# Patient Record
Sex: FEMALE | Race: BLACK OR AFRICAN AMERICAN | NOT HISPANIC OR LATINO | Employment: OTHER | URBAN - METROPOLITAN AREA
[De-identification: names, ages, dates, MRNs, and addresses within clinical notes are randomized per-mention and may not be internally consistent; named-entity substitution may affect disease eponyms.]

---

## 2017-05-24 ENCOUNTER — OFFICE VISIT (OUTPATIENT)
Dept: OBSTETRICS AND GYNECOLOGY | Facility: CLINIC | Age: 57
End: 2017-05-24

## 2017-05-24 VITALS
WEIGHT: 200.3 LBS | HEIGHT: 65 IN | DIASTOLIC BLOOD PRESSURE: 84 MMHG | BODY MASS INDEX: 33.37 KG/M2 | SYSTOLIC BLOOD PRESSURE: 142 MMHG

## 2017-05-24 DIAGNOSIS — N89.8 VAGINAL DISCHARGE: Primary | ICD-10-CM

## 2017-05-24 PROCEDURE — 99213 OFFICE O/P EST LOW 20 MIN: CPT | Performed by: NURSE PRACTITIONER

## 2017-05-26 LAB
A VAGINAE DNA VAG QL NAA+PROBE: NORMAL SCORE
BVAB2 DNA VAG QL NAA+PROBE: NORMAL SCORE
C ALBICANS DNA VAG QL NAA+PROBE: NEGATIVE
C GLABRATA DNA VAG QL NAA+PROBE: NEGATIVE
MEGA1 DNA VAG QL NAA+PROBE: NORMAL SCORE
T VAGINALIS RRNA SPEC QL NAA+PROBE: NEGATIVE

## 2017-05-30 ENCOUNTER — TELEPHONE (OUTPATIENT)
Dept: OBSTETRICS AND GYNECOLOGY | Facility: CLINIC | Age: 57
End: 2017-05-30

## 2017-05-31 RX ORDER — METRONIDAZOLE 500 MG/1
500 TABLET ORAL 2 TIMES DAILY
Qty: 14 TABLET | Refills: 0 | Status: SHIPPED | OUTPATIENT
Start: 2017-05-31 | End: 2020-08-26

## 2018-05-02 ENCOUNTER — OFFICE VISIT (OUTPATIENT)
Dept: OBSTETRICS AND GYNECOLOGY | Facility: CLINIC | Age: 58
End: 2018-05-02

## 2018-05-02 VITALS
BODY MASS INDEX: 34.16 KG/M2 | SYSTOLIC BLOOD PRESSURE: 122 MMHG | HEIGHT: 65 IN | DIASTOLIC BLOOD PRESSURE: 80 MMHG | WEIGHT: 205 LBS

## 2018-05-02 DIAGNOSIS — N88.8 NABOTHIAN CYST: ICD-10-CM

## 2018-05-02 DIAGNOSIS — Z78.0 MENOPAUSE: ICD-10-CM

## 2018-05-02 DIAGNOSIS — Z01.411 ENCOUNTER FOR GYNECOLOGICAL EXAMINATION WITH ABNORMAL FINDING: Primary | ICD-10-CM

## 2018-05-02 LAB
DEVELOPER EXPIRATION DATE: NORMAL
DEVELOPER LOT NUMBER: NORMAL
EXPIRATION DATE: NORMAL
FECAL OCCULT BLOOD SCREEN, POC: NEGATIVE
Lab: NORMAL
NEGATIVE CONTROL: NEGATIVE
POSITIVE CONTROL: POSITIVE

## 2018-05-02 PROCEDURE — G0328 FECAL BLOOD SCRN IMMUNOASSAY: HCPCS | Performed by: OBSTETRICS & GYNECOLOGY

## 2018-05-02 PROCEDURE — 99396 PREV VISIT EST AGE 40-64: CPT | Performed by: OBSTETRICS & GYNECOLOGY

## 2018-05-02 NOTE — PROGRESS NOTES
Subjective    Chief Complaint   Patient presents with   • Gynecologic Exam     MX TODAY. DX NOT DONE. C-SCOPY 2 YRS. AGO       History of Present Illness    Zack Coppola is a 57 y.o. female who presents for annual exam. Patient had a mammogram today and is going to check her insurance concerning DEXA scan's.  Colonoscopy was 2 years ago and due every 5 years due to a family history.  She is a nonsmoker.  She is having no bleeding or issues.    Obstetric History:  OB History     No data available         Menstrual History:     No LMP recorded.       Past Medical History:   Diagnosis Date   • Diabetes    • Hypertension      Family History   Problem Relation Age of Onset   • Hypertension Mother    • Hypertension Brother    • Hypertension Sister    • Hypertension Brother        The following portions of the patient's history were reviewed and updated as appropriate: allergies, current medications, past family history, past medical history, past social history, past surgical history and problem list.    Review of Systems   Constitutional: Negative.  Negative for fever and unexpected weight change.   HENT: Negative.    Respiratory: Negative for shortness of breath and wheezing.    Cardiovascular: Negative for chest pain, palpitations and leg swelling.   Gastrointestinal: Negative for abdominal pain, anal bleeding and blood in stool.   Genitourinary: Negative for dysuria, pelvic pain, urgency, vaginal bleeding, vaginal discharge and vaginal pain.   Skin: Negative.    Neurological: Negative.    Hematological: Negative.  Negative for adenopathy.   Psychiatric/Behavioral: Negative.  Negative for dysphoric mood. The patient is not nervous/anxious.             Objective   Physical Exam   Constitutional: She is oriented to person, place, and time. Vital signs are normal. She appears well-developed and well-nourished.   HENT:   Head: Normocephalic.   Neck: Trachea normal. No tracheal deviation present. No thyromegaly present.  "  Cardiovascular: Normal rate, regular rhythm and normal heart sounds.    No murmur heard.  Pulmonary/Chest: Effort normal and breath sounds normal.   Breasts without masses, tenderness or nipple discharge   Abdominal: Soft. Normal appearance. She exhibits no mass. There is no hepatosplenomegaly. There is no tenderness. No hernia.   Genitourinary: Rectum normal, vagina normal and uterus normal. Rectal exam shows guaiac negative stool. Uterus is not enlarged and not tender. Cervix exhibits no motion tenderness. Right adnexum displays no mass and no tenderness. Left adnexum displays no mass and no tenderness. No vaginal discharge found.   Genitourinary Comments: External genitalia normal .  Tiny probable nabothian cyst drained with a scalpel and silver nitrate applied. Pt will rto 3 weeks to recheck it.    Lymphadenopathy:     She has no cervical adenopathy.     She has no axillary adenopathy.   Neurological: She is alert and oriented to person, place, and time.   Skin: Skin is warm and dry. No rash noted.   Psychiatric: She has a normal mood and affect. Her behavior is normal. Cognition and memory are normal.       /80   Ht 165.1 cm (65\")   Wt 93 kg (205 lb)   BMI 34.11 kg/m²     Assessment/Plan   Zack was seen today for gynecologic exam.    Diagnoses and all orders for this visit:    Encounter for gynecological examination with abnormal finding  -     IGP,rfx Aptima HPV All Pth  -     POC Occult Blood Stool    Nabothian cyst    Menopause        Getting copy of mammogram.  Patient to return to office in 3 weeks to recheck her nabothian cyst.  If still present will need a biopsy.    Counseled about the importance of beginning calcium with vitamin D twice daily.  Also discussed DEXA scan's.         "

## 2018-05-04 LAB
CONV .: NORMAL
CYTOLOGIST CVX/VAG CYTO: NORMAL
CYTOLOGY CVX/VAG DOC THIN PREP: NORMAL
DX ICD CODE: NORMAL
HIV 1 & 2 AB SER-IMP: NORMAL
OTHER STN SPEC: NORMAL
PATH REPORT.FINAL DX SPEC: NORMAL
STAT OF ADQ CVX/VAG CYTO-IMP: NORMAL

## 2018-07-11 ENCOUNTER — OFFICE VISIT (OUTPATIENT)
Dept: OBSTETRICS AND GYNECOLOGY | Facility: CLINIC | Age: 58
End: 2018-07-11

## 2018-07-11 ENCOUNTER — OFFICE (OUTPATIENT)
Dept: URBAN - METROPOLITAN AREA CLINIC 42 | Facility: CLINIC | Age: 58
End: 2018-07-11

## 2018-07-11 VITALS
BODY MASS INDEX: 34.49 KG/M2 | HEIGHT: 65 IN | SYSTOLIC BLOOD PRESSURE: 148 MMHG | WEIGHT: 207 LBS | DIASTOLIC BLOOD PRESSURE: 82 MMHG

## 2018-07-11 VITALS — WEIGHT: 208 LBS | HEIGHT: 67 IN

## 2018-07-11 DIAGNOSIS — R10.13 EPIGASTRIC PAIN: ICD-10-CM

## 2018-07-11 DIAGNOSIS — N95.0 POSTMENOPAUSAL BLEEDING: Primary | ICD-10-CM

## 2018-07-11 DIAGNOSIS — R11.0 NAUSEA: ICD-10-CM

## 2018-07-11 DIAGNOSIS — K76.89 OTHER SPECIFIED DISEASES OF LIVER: ICD-10-CM

## 2018-07-11 PROCEDURE — 58100 BIOPSY OF UTERUS LINING: CPT | Performed by: OBSTETRICS & GYNECOLOGY

## 2018-07-11 PROCEDURE — 99214 OFFICE O/P EST MOD 30 MIN: CPT

## 2018-07-11 PROCEDURE — 99213 OFFICE O/P EST LOW 20 MIN: CPT | Performed by: OBSTETRICS & GYNECOLOGY

## 2018-07-11 NOTE — PROGRESS NOTES
"Subjective    Chief Complaint   Patient presents with   • Follow-up     fu, re check vag cyst,       History of Present Illness    Zack Coppola is a 57 y.o. female who presents for Repeat check nabothian cyst which was drained.  Pap smear was negative.  Patient having no other problems in no vaginal bleeding for years.    Obstetric History:  OB History     No data available         Menstrual History:     No LMP recorded.       Past Medical History:   Diagnosis Date   • Diabetes (CMS/HCC)    • Hypertension      Family History   Problem Relation Age of Onset   • Hypertension Mother    • Hypertension Brother    • Hypertension Sister    • Hypertension Brother            Review of Systems  Negative        Objective   Physical Exam  Nabothian cyst totally resolved today.  Patient had a little brown discharge in the vagina it as a precaution and endometrial biopsy was advised and the patient agreed to it.  Endometrial biopsy performed with the anterior lip of the cervix grasped with a tenaculum and the uterus sounded to 8 cm.  An endometrial biopsy was performed 3 times without problem and without dilatation of the cervix.  The patient tolerated the procedure well and will call 1 week for results.  /82   Ht 165.1 cm (65\")   Wt 93.9 kg (207 lb)   BMI 34.45 kg/m²     Assessment/Plan   Zack was seen today for follow-up.    Diagnoses and all orders for this visit:    Postmenopausal bleeding  -     Reference Histopathology  -     Endometrial Biopsy        Call 1 week for results of endometrial biopsy.               "

## 2018-07-13 LAB
DX ICD CODE: NORMAL
DX ICD CODE: NORMAL
PATH REPORT.FINAL DX SPEC: NORMAL
PATH REPORT.GROSS SPEC: NORMAL
PATH REPORT.SITE OF ORIGIN SPEC: NORMAL
PATHOLOGIST NAME: NORMAL
PAYMENT PROCEDURE: NORMAL

## 2018-07-18 ENCOUNTER — TELEPHONE (OUTPATIENT)
Dept: OBSTETRICS AND GYNECOLOGY | Facility: CLINIC | Age: 58
End: 2018-07-18

## 2018-07-18 ENCOUNTER — ON CAMPUS - OUTPATIENT (OUTPATIENT)
Dept: URBAN - METROPOLITAN AREA HOSPITAL 91 | Facility: HOSPITAL | Age: 58
End: 2018-07-18
Payer: COMMERCIAL

## 2018-07-18 DIAGNOSIS — K30 FUNCTIONAL DYSPEPSIA: ICD-10-CM

## 2018-07-18 DIAGNOSIS — K44.9 DIAPHRAGMATIC HERNIA WITHOUT OBSTRUCTION OR GANGRENE: ICD-10-CM

## 2018-07-18 DIAGNOSIS — K20.8 OTHER ESOPHAGITIS: ICD-10-CM

## 2018-07-18 DIAGNOSIS — R11.0 NAUSEA: ICD-10-CM

## 2018-07-18 DIAGNOSIS — K31.7 POLYP OF STOMACH AND DUODENUM: ICD-10-CM

## 2018-07-18 DIAGNOSIS — R10.13 EPIGASTRIC PAIN: ICD-10-CM

## 2018-07-18 PROCEDURE — 43235 EGD DIAGNOSTIC BRUSH WASH: CPT

## 2018-07-18 NOTE — TELEPHONE ENCOUNTER
----- Message from Mitchel Aquino MD sent at 7/13/2018  5:57 PM EDT -----  Moncho please tell pt EMB was totally benign. Thanks KINGSLEY

## 2018-09-24 ENCOUNTER — TELEPHONE (OUTPATIENT)
Dept: OBSTETRICS AND GYNECOLOGY | Facility: CLINIC | Age: 58
End: 2018-09-24

## 2019-04-16 DIAGNOSIS — Z12.31 VISIT FOR SCREENING MAMMOGRAM: Primary | ICD-10-CM

## 2019-05-06 DIAGNOSIS — Z12.31 VISIT FOR SCREENING MAMMOGRAM: ICD-10-CM

## 2019-06-12 ENCOUNTER — OFFICE VISIT (OUTPATIENT)
Dept: OBSTETRICS AND GYNECOLOGY | Facility: CLINIC | Age: 59
End: 2019-06-12

## 2019-06-12 VITALS
WEIGHT: 206.2 LBS | BODY MASS INDEX: 34.35 KG/M2 | DIASTOLIC BLOOD PRESSURE: 74 MMHG | SYSTOLIC BLOOD PRESSURE: 122 MMHG | HEIGHT: 65 IN

## 2019-06-12 DIAGNOSIS — Z01.419 ENCOUNTER FOR GYNECOLOGICAL EXAMINATION WITHOUT ABNORMAL FINDING: Primary | ICD-10-CM

## 2019-06-12 DIAGNOSIS — Z12.39 SCREENING FOR BREAST CANCER: ICD-10-CM

## 2019-06-12 PROCEDURE — G0328 FECAL BLOOD SCRN IMMUNOASSAY: HCPCS | Performed by: OBSTETRICS & GYNECOLOGY

## 2019-06-12 PROCEDURE — 99396 PREV VISIT EST AGE 40-64: CPT | Performed by: OBSTETRICS & GYNECOLOGY

## 2019-06-12 NOTE — PROGRESS NOTES
Subjective    Chief Complaint   Patient presents with   • Gynecologic Exam     ae, no problems, pm no bleeding, no hrt's      History of Present Illness    Zack Coppola is a 58 y.o. female who presents for annual exam.  Non-smoker.  Mammogram May 2019 normal.  DEXA scan's not done due to cost.  Colonoscopy was in 2016 and due every 5 due to a history of polyps.  No current bleeding or problems.    Obstetric History:  OB History     No data available         Menstrual History:     No LMP recorded.       Past Medical History:   Diagnosis Date   • Diabetes (CMS/HCC)    • Hypertension      Family History   Problem Relation Age of Onset   • Hypertension Mother    • Hypertension Brother    • Hypertension Sister    • Hypertension Brother      Social History     Tobacco Use   Smoking Status Former Smoker   Smokeless Tobacco Never Used     Counseling given: Not Answered      The following portions of the patient's history were reviewed and updated as appropriate: allergies, current medications, past family history, past medical history, past social history, past surgical history and problem list.    Review of Systems   Constitutional: Negative.  Negative for fever and unexpected weight change.   HENT: Negative.    Respiratory: Negative for shortness of breath and wheezing.    Cardiovascular: Negative for chest pain, palpitations and leg swelling.   Gastrointestinal: Negative for abdominal pain, anal bleeding and blood in stool.   Genitourinary: Negative for dysuria, pelvic pain, urgency, vaginal bleeding, vaginal discharge and vaginal pain.   Skin: Negative.    Neurological: Negative.    Hematological: Negative.  Negative for adenopathy.   Psychiatric/Behavioral: Negative.  Negative for dysphoric mood. The patient is not nervous/anxious.             Objective   Physical Exam   Constitutional: She is oriented to person, place, and time. Vital signs are normal. She appears well-developed and well-nourished.   HENT:   Head:  "Normocephalic.   Neck: Trachea normal. No tracheal deviation present. No thyromegaly present.   Cardiovascular: Normal rate, regular rhythm and normal heart sounds.   No murmur heard.  Pulmonary/Chest: Effort normal and breath sounds normal.   Breasts without masses, tenderness or nipple discharge   Abdominal: Soft. Normal appearance. She exhibits no mass. There is no hepatosplenomegaly. There is no tenderness. No hernia.   Genitourinary: Rectum normal, vagina normal and uterus normal. Rectal exam shows guaiac negative stool. Uterus is not enlarged and not tender. Cervix exhibits no motion tenderness. Right adnexum displays no mass and no tenderness. Left adnexum displays no mass and no tenderness. No vaginal discharge found.   Genitourinary Comments: External genitalia normal    Lymphadenopathy:     She has no cervical adenopathy.     She has no axillary adenopathy.   Neurological: She is alert and oriented to person, place, and time.   Skin: Skin is warm and dry. No rash noted.   Psychiatric: She has a normal mood and affect. Her behavior is normal. Cognition and memory are normal.       /74   Ht 165.1 cm (65\")   Wt 93.5 kg (206 lb 3.2 oz)   BMI 34.31 kg/m²     Assessment/Plan   Zack was seen today for gynecologic exam.    Diagnoses and all orders for this visit:    Encounter for gynecological examination without abnormal finding  -     IGP,rfx Aptima HPV All Pth  -     POC Occult Blood Stool    Screening for breast cancer        Return to office 1 year.    Counseled about taking calcium with vitamin D.  Patient takes vitamin D currently.         "

## 2019-06-14 LAB
CONV .: NORMAL
CYTOLOGIST CVX/VAG CYTO: NORMAL
CYTOLOGY CVX/VAG DOC CYTO: NORMAL
CYTOLOGY CVX/VAG DOC THIN PREP: NORMAL
DX ICD CODE: NORMAL
HIV 1 & 2 AB SER-IMP: NORMAL
OTHER STN SPEC: NORMAL
STAT OF ADQ CVX/VAG CYTO-IMP: NORMAL

## 2019-10-09 ENCOUNTER — OFFICE VISIT (OUTPATIENT)
Dept: OBSTETRICS AND GYNECOLOGY | Facility: CLINIC | Age: 59
End: 2019-10-09

## 2019-10-09 VITALS
BODY MASS INDEX: 34.66 KG/M2 | WEIGHT: 208 LBS | HEIGHT: 65 IN | DIASTOLIC BLOOD PRESSURE: 90 MMHG | SYSTOLIC BLOOD PRESSURE: 148 MMHG

## 2019-10-09 DIAGNOSIS — N89.8 VAGINAL DISCHARGE: Primary | ICD-10-CM

## 2019-10-09 DIAGNOSIS — R10.2 PELVIC PAIN: ICD-10-CM

## 2019-10-09 PROCEDURE — 99213 OFFICE O/P EST LOW 20 MIN: CPT | Performed by: OBSTETRICS & GYNECOLOGY

## 2019-10-09 NOTE — PROGRESS NOTES
"Subjective    Chief Complaint   Patient presents with   • Follow-up     PT STATES ITCHING, ODOR, DISCHARGE      History of Present Illness    Zack Coppola is a 58 y.o. female who presents for evaluation of recent minor vaginal discharge and odor without itching.  She also has 2-week history of some mild left lower quadrant pain.  Her discharge is also been for about 2 weeks and she has used no medication for it.  She had a Pap smear this summer which was negative.    Obstetric History:  OB History     No data available         Menstrual History:     No LMP recorded.       Past Medical History:   Diagnosis Date   • Diabetes (CMS/[facility])    • Hypertension      Family History   Problem Relation Age of Onset   • Hypertension Mother    • Hypertension Brother    • Hypertension Sister    • Hypertension Brother        The following portions of the patient's history were reviewed and updated as appropriate: allergies, current medications, past family history, past medical history, past social history and problem list.    Review of Systems  As per HPI       Objective   Physical Exam  Examination of the vagina demonstrate what appears to be normal physiologic discharge but a vaginal culture for vaginitis was performed anyway.  Cervix is without lesion.  Uterus nontender and normal size.  Very minimal left adnexal tenderness which does not appear to be very significant today.  /90   Ht 165.1 cm (65\")   Wt 94.3 kg (208 lb)   BMI 34.61 kg/m²     Assessment/Plan   Zack was seen today for follow-up.    Diagnoses and all orders for this visit:    Vaginal discharge  -     NuSwab Vaginitis (VG) - Swab, Vagina    Pelvic pain        Impression and plan.  15 minute visit today of which 10 minutes was face-to-face counseling concerning the reasons for vaginal discharge and pain.  I have obtained a culture for vaginitis although patient not needing testing for STDs she states that she is not sexually active.  We also discussed " getting an ultrasound of her pain persists over the next week while her cultures returning and she will let me know if she would like that ordered when we speak next week about her results.  I explained to her that her vaginal discharge may in fact be normal physiologic discharge.

## 2019-10-12 LAB
A VAGINAE DNA VAG QL NAA+PROBE: ABNORMAL SCORE
BVAB2 DNA VAG QL NAA+PROBE: ABNORMAL SCORE
C ALBICANS DNA VAG QL NAA+PROBE: POSITIVE
C GLABRATA DNA VAG QL NAA+PROBE: NEGATIVE
MEGA1 DNA VAG QL NAA+PROBE: ABNORMAL SCORE
T VAGINALIS RRNA SPEC QL NAA+PROBE: NEGATIVE

## 2019-10-14 ENCOUNTER — TELEPHONE (OUTPATIENT)
Dept: OBSTETRICS AND GYNECOLOGY | Facility: CLINIC | Age: 59
End: 2019-10-14

## 2019-10-14 NOTE — TELEPHONE ENCOUNTER
----- Message from Mitchel Aquino MD sent at 10/13/2019 10:06 AM EDT -----  Please tell pt she has yeast and I sent Terazol-7 to her pharmacy. KINGSLEY

## 2019-10-30 ENCOUNTER — OFFICE (OUTPATIENT)
Dept: URBAN - METROPOLITAN AREA CLINIC 42 | Facility: CLINIC | Age: 59
End: 2019-10-30

## 2019-10-30 VITALS
WEIGHT: 213 LBS | SYSTOLIC BLOOD PRESSURE: 164 MMHG | TEMPERATURE: 97 F | DIASTOLIC BLOOD PRESSURE: 104 MMHG | HEIGHT: 67 IN | HEART RATE: 77 BPM

## 2019-10-30 DIAGNOSIS — R14.0 ABDOMINAL DISTENSION (GASEOUS): ICD-10-CM

## 2019-10-30 DIAGNOSIS — K76.89 OTHER SPECIFIED DISEASES OF LIVER: ICD-10-CM

## 2019-10-30 DIAGNOSIS — K21.9 GASTRO-ESOPHAGEAL REFLUX DISEASE WITHOUT ESOPHAGITIS: ICD-10-CM

## 2019-10-30 PROCEDURE — 99214 OFFICE O/P EST MOD 30 MIN: CPT

## 2019-10-30 RX ORDER — PANTOPRAZOLE SODIUM 40 MG/1
40 TABLET, DELAYED RELEASE ORAL
Qty: 90 | Refills: 3 | Status: ACTIVE
Start: 2019-10-30

## 2019-12-05 ENCOUNTER — TELEPHONE (OUTPATIENT)
Dept: OBSTETRICS AND GYNECOLOGY | Facility: CLINIC | Age: 59
End: 2019-12-05

## 2019-12-05 NOTE — TELEPHONE ENCOUNTER
Patient says that she can not find her prescription for terconazole (TERAZOL 7) 0.4 % vaginal cream    She wants to know if more can be called in to her pharmacy.

## 2020-04-08 ENCOUNTER — TELEPHONE (OUTPATIENT)
Dept: OBSTETRICS AND GYNECOLOGY | Facility: CLINIC | Age: 60
End: 2020-04-08

## 2020-04-08 DIAGNOSIS — Z12.31 VISIT FOR SCREENING MAMMOGRAM: Primary | ICD-10-CM

## 2020-04-08 NOTE — TELEPHONE ENCOUNTER
Patient states that she needs her referral for her mammogram dated for sometime after May the 3rd  [facility].

## 2020-08-26 ENCOUNTER — OFFICE VISIT (OUTPATIENT)
Dept: OBSTETRICS AND GYNECOLOGY | Facility: CLINIC | Age: 60
End: 2020-08-26

## 2020-08-26 VITALS
HEIGHT: 65 IN | WEIGHT: 209 LBS | BODY MASS INDEX: 34.82 KG/M2 | SYSTOLIC BLOOD PRESSURE: 142 MMHG | DIASTOLIC BLOOD PRESSURE: 70 MMHG

## 2020-08-26 DIAGNOSIS — Z12.39 SCREENING FOR BREAST CANCER: ICD-10-CM

## 2020-08-26 DIAGNOSIS — Z01.419 ENCOUNTER FOR GYNECOLOGICAL EXAMINATION WITHOUT ABNORMAL FINDING: Primary | ICD-10-CM

## 2020-08-26 PROCEDURE — 99396 PREV VISIT EST AGE 40-64: CPT | Performed by: OBSTETRICS & GYNECOLOGY

## 2020-08-26 RX ORDER — SODIUM, POTASSIUM,MAG SULFATES 17.5-3.13G
SOLUTION, RECONSTITUTED, ORAL ORAL
COMMUNITY
Start: 2020-06-23 | End: 2021-11-01

## 2020-08-26 RX ORDER — AMLODIPINE BESYLATE 5 MG/1
TABLET ORAL
COMMUNITY
Start: 2020-07-29 | End: 2021-11-01

## 2020-08-26 RX ORDER — ERGOCALCIFEROL 1.25 MG/1
CAPSULE ORAL
COMMUNITY
Start: 2020-07-29

## 2020-08-26 NOTE — PROGRESS NOTES
Subjective    Chief Complaint   Patient presents with   • Gynecologic Exam     AE      History of Present Illness    Zack Coppola is a 59 y.o. female who presents for annual exam.  Non-smoker.  Colonoscopy scheduled August 31..  Refused DEXA scan due to cost.  Mammogram done in July and negative.  No bleeding no problems.    Obstetric History:  OB History    None        Menstrual History:     No LMP recorded.       Past Medical History:   Diagnosis Date   • Diabetes (CMS/HCC)    • Hypertension      Family History   Problem Relation Age of Onset   • Hypertension Mother    • Hypertension Brother    • Hypertension Sister    • Hypertension Brother      Social History     Tobacco Use   Smoking Status Former Smoker   Smokeless Tobacco Never Used         The following portions of the patient's history were reviewed and updated as appropriate: allergies, current medications, past family history, past medical history, past social history, past surgical history and problem list.    Review of Systems   Constitutional: Negative.  Negative for fever and unexpected weight change.   HENT: Negative.    Respiratory: Negative for shortness of breath and wheezing.    Cardiovascular: Negative for chest pain, palpitations and leg swelling.   Gastrointestinal: Negative for abdominal pain, anal bleeding and blood in stool.   Genitourinary: Negative for dysuria, pelvic pain, urgency, vaginal bleeding, vaginal discharge and vaginal pain.   Skin: Negative.    Neurological: Negative.    Hematological: Negative.  Negative for adenopathy.   Psychiatric/Behavioral: Negative.  Negative for dysphoric mood. The patient is not nervous/anxious.             Objective   Physical Exam   Constitutional: She is oriented to person, place, and time. Vital signs are normal. She appears well-developed and well-nourished.   HENT:   Head: Normocephalic.   Neck: Trachea normal. No tracheal deviation present. No thyromegaly present.   Cardiovascular: Normal  "rate, regular rhythm and normal heart sounds.   No murmur heard.  Pulmonary/Chest: Effort normal and breath sounds normal.   Breasts without masses, tenderness or nipple discharge   Abdominal: Soft. Normal appearance. She exhibits no mass. There is no hepatosplenomegaly. There is no tenderness. No hernia.   Genitourinary: Rectum normal, vagina normal and uterus normal. Uterus is not enlarged and not tender. Cervix exhibits no motion tenderness. Right adnexum displays no mass and no tenderness. Left adnexum displays no mass and no tenderness. No vaginal discharge found.   Genitourinary Comments: External genitalia normal    Lymphadenopathy:     She has no cervical adenopathy.     She has no axillary adenopathy.   Neurological: She is alert and oriented to person, place, and time.   Skin: Skin is warm and dry. No rash noted.   Psychiatric: She has a normal mood and affect. Her behavior is normal. Cognition and memory are normal.       /70   Ht 165.1 cm (65\")   Wt 94.8 kg (209 lb)   BMI 34.78 kg/m²     Assessment/Plan   Zack was seen today for gynecologic exam.    Diagnoses and all orders for this visit:    Encounter for gynecological examination without abnormal finding  -     IGP,rfx Aptima HPV All Pth    Screening for breast cancer        Return to office 1 year or as needed.  Counseled about continuing calcium and vitamin D.  Counseled about colorectal screening.             "

## 2021-11-01 ENCOUNTER — OFFICE VISIT (OUTPATIENT)
Dept: OBSTETRICS AND GYNECOLOGY | Facility: CLINIC | Age: 61
End: 2021-11-01

## 2021-11-01 VITALS
SYSTOLIC BLOOD PRESSURE: 129 MMHG | BODY MASS INDEX: 34.49 KG/M2 | WEIGHT: 207 LBS | HEIGHT: 65 IN | DIASTOLIC BLOOD PRESSURE: 93 MMHG

## 2021-11-01 DIAGNOSIS — Z01.419 ENCOUNTER FOR GYNECOLOGICAL EXAMINATION WITHOUT ABNORMAL FINDING: Primary | ICD-10-CM

## 2021-11-01 DIAGNOSIS — Z12.39 ENCOUNTER FOR BREAST CANCER SCREENING USING NON-MAMMOGRAM MODALITY: ICD-10-CM

## 2021-11-01 DIAGNOSIS — N89.8 VAGINAL DISCHARGE: ICD-10-CM

## 2021-11-01 PROCEDURE — 99396 PREV VISIT EST AGE 40-64: CPT | Performed by: OBSTETRICS & GYNECOLOGY

## 2021-11-01 NOTE — PROGRESS NOTES
Subjective    Chief Complaint   Patient presents with   • Gynecologic Exam     AE      History of Present Illness    Zack Coppola is a 60 y.o. female who presents for annual exam.  Non-smoker.  Mammogram July 2021-negative.  Colonoscopy 2020 had a polyp so due again after 5 years.  Refused DEXA scan.  Is taking vitamin D3.  No current problems and no bleeding.    Obstetric History:  OB History    No obstetric history on file.        Menstrual History:     No LMP recorded. Patient is postmenopausal.       Past Medical History:   Diagnosis Date   • Diabetes (HCC)    • Hypertension      Family History   Problem Relation Age of Onset   • Hypertension Mother    • Hypertension Brother    • Hypertension Sister    • Hypertension Brother      Social History     Tobacco Use   Smoking Status Former Smoker   Smokeless Tobacco Never Used         The following portions of the patient's history were reviewed and updated as appropriate: allergies, current medications, past family history, past medical history, past social history, past surgical history and problem list.    Review of Systems   Constitutional: Negative.  Negative for fever and unexpected weight change.   HENT: Negative.    Respiratory: Negative for shortness of breath and wheezing.    Cardiovascular: Negative for chest pain, palpitations and leg swelling.   Gastrointestinal: Negative for abdominal pain, anal bleeding and blood in stool.   Genitourinary: Negative for dysuria, pelvic pain, urgency, vaginal bleeding, vaginal discharge and vaginal pain.   Skin: Negative.    Neurological: Negative.    Hematological: Negative.  Negative for adenopathy.   Psychiatric/Behavioral: Negative.  Negative for dysphoric mood. The patient is not nervous/anxious.             Objective   Physical Exam  Exam conducted with a chaperone present.   Constitutional:       Appearance: Normal appearance. She is well-developed.   HENT:      Head: Normocephalic.   Neck:      Thyroid: No  "thyromegaly.      Trachea: Trachea normal. No tracheal deviation.   Cardiovascular:      Rate and Rhythm: Normal rate and regular rhythm.      Heart sounds: Normal heart sounds. No murmur heard.      Pulmonary:      Effort: Pulmonary effort is normal.      Breath sounds: Normal breath sounds.   Chest:   Breasts:      Right: Normal. No mass, nipple discharge, tenderness or axillary adenopathy.      Left: Normal. No mass, nipple discharge, tenderness or axillary adenopathy.       Abdominal:      Palpations: Abdomen is soft. There is no mass.      Tenderness: There is no abdominal tenderness.      Hernia: No hernia is present.   Genitourinary:     General: Normal vulva.      Labia:         Right: No tenderness or lesion.         Left: No tenderness or lesion.       Urethra: No prolapse or urethral lesion.      Vagina: Normal. No vaginal discharge or lesions.      Cervix: No cervical motion tenderness.      Uterus: Not enlarged and not tender.       Adnexa:         Right: No mass or tenderness.          Left: No mass or tenderness.        Rectum: Normal. No external hemorrhoid or internal hemorrhoid. Normal anal tone.      Comments: External genitalia normal   Lymphadenopathy:      Cervical: No cervical adenopathy.      Upper Body:      Right upper body: No axillary adenopathy.      Left upper body: No axillary adenopathy.   Skin:     General: Skin is warm and dry.      Findings: No rash.   Neurological:      Mental Status: She is alert and oriented to person, place, and time.   Psychiatric:         Behavior: Behavior normal.         /93   Ht 165.1 cm (65\")   Wt 93.9 kg (207 lb)   BMI 34.45 kg/m²     Assessment/Plan   Diagnoses and all orders for this visit:    1. Encounter for gynecological examination without abnormal finding (Primary)  -     IGP,rfx Aptima HPV All Pth    2. Encounter for breast cancer screening using non-mammogram modality    3. Vaginal discharge  -     NuSwab VG+ - Swab, " Vagina        Return to office 1 year.  Counseled about continuing vitamin D3 for osteoporosis prevention.

## 2021-11-03 LAB
A VAGINAE DNA VAG QL NAA+PROBE: ABNORMAL SCORE
BVAB2 DNA VAG QL NAA+PROBE: ABNORMAL SCORE
C ALBICANS DNA VAG QL NAA+PROBE: POSITIVE
C GLABRATA DNA VAG QL NAA+PROBE: NEGATIVE
C TRACH DNA VAG QL NAA+PROBE: NEGATIVE
MEGA1 DNA VAG QL NAA+PROBE: ABNORMAL SCORE
N GONORRHOEA DNA VAG QL NAA+PROBE: NEGATIVE
T VAGINALIS DNA VAG QL NAA+PROBE: NEGATIVE

## 2021-11-05 ENCOUNTER — TELEPHONE (OUTPATIENT)
Dept: OBSTETRICS AND GYNECOLOGY | Facility: CLINIC | Age: 61
End: 2021-11-05

## 2021-11-05 RX ORDER — FLUCONAZOLE 150 MG/1
150 TABLET ORAL ONCE
Qty: 1 TABLET | Refills: 0 | Status: SHIPPED | OUTPATIENT
Start: 2021-11-05 | End: 2021-11-05

## 2021-11-05 NOTE — TELEPHONE ENCOUNTER
----- Message from Mitchel Aquino MD sent at 11/5/2021  1:40 PM EDT -----  Please tell patient that she only had yeast on her vaginal culture so I did send a Diflucan tablet to her pharmacy.

## 2021-11-05 NOTE — PROGRESS NOTES
Please tell patient that she only had yeast on her vaginal culture so I did send a Diflucan tablet to her pharmacy.

## 2022-02-21 ENCOUNTER — OFFICE VISIT (OUTPATIENT)
Dept: OBSTETRICS AND GYNECOLOGY | Facility: CLINIC | Age: 62
End: 2022-02-21

## 2022-02-21 VITALS
HEIGHT: 65 IN | DIASTOLIC BLOOD PRESSURE: 88 MMHG | SYSTOLIC BLOOD PRESSURE: 130 MMHG | WEIGHT: 207 LBS | BODY MASS INDEX: 34.49 KG/M2

## 2022-02-21 DIAGNOSIS — N89.8 VAGINAL DISCHARGE: Primary | ICD-10-CM

## 2022-02-21 PROCEDURE — 99213 OFFICE O/P EST LOW 20 MIN: CPT | Performed by: OBSTETRICS & GYNECOLOGY

## 2022-02-21 RX ORDER — POTASSIUM CHLORIDE 750 MG/1
CAPSULE, EXTENDED RELEASE ORAL
COMMUNITY
Start: 2022-01-20

## 2022-02-21 NOTE — PROGRESS NOTES
"Subjective    Chief Complaint   Patient presents with   • Follow-up     Pt states itching, discharge, odor       History of Present Illness    Zack Coppola is a 61 y.o. female who presents for vaginal discharge with odor and itching.  Patient did have yeast demonstrated on vaginal culture last year.  Patient has not taken anything for this current infection.  She is not currently sexually active.    Obstetric History:  OB History    No obstetric history on file.        Menstrual History:     No LMP recorded. Patient is postmenopausal.       Past Medical History:   Diagnosis Date   • Diabetes (HCC)    • Hypertension      Family History   Problem Relation Age of Onset   • Hypertension Mother    • Hypertension Brother    • Hypertension Sister    • Hypertension Brother        The following portions of the patient's history were reviewed and updated as appropriate: allergies, current medications, past medical history, past social history, past surgical history and problem list.    Review of Systems  As per HPI       Objective   Physical Exam  Vagina demonstrates a milky white discharge not very consistent with yeast.  Vaginal culture performed.  Bimanual exam totally negative.  /88   Ht 165.1 cm (65\")   Wt 93.9 kg (207 lb)   BMI 34.45 kg/m²     Assessment/Plan   Diagnoses and all orders for this visit:    1. Vaginal discharge (Primary)  -     NuSwab Vaginitis (VG) - Swab, Vagina        Impression and plan.  Patient will call next week to check on vaginal culture.  Discussed the possibility this is bacterial vaginosis and treatment would probably be Flagyl pills.  Will await culture results.               "

## 2022-02-23 ENCOUNTER — TELEPHONE (OUTPATIENT)
Dept: OBSTETRICS AND GYNECOLOGY | Facility: CLINIC | Age: 62
End: 2022-02-23

## 2022-02-23 LAB
A VAGINAE DNA VAG QL NAA+PROBE: NORMAL SCORE
BVAB2 DNA VAG QL NAA+PROBE: NORMAL SCORE
C ALBICANS DNA VAG QL NAA+PROBE: NEGATIVE
C GLABRATA DNA VAG QL NAA+PROBE: NEGATIVE
MEGA1 DNA VAG QL NAA+PROBE: NORMAL SCORE
T VAGINALIS DNA VAG QL NAA+PROBE: NEGATIVE

## 2022-02-23 RX ORDER — FLUCONAZOLE 150 MG/1
150 TABLET ORAL ONCE
Qty: 1 TABLET | Refills: 0 | Status: SHIPPED | OUTPATIENT
Start: 2022-02-23 | End: 2022-02-23

## 2022-02-23 NOTE — TELEPHONE ENCOUNTER
Pt informed of results and stated understanding. Did ask if a refill of Diflucan could be sent in as she still is having the itching. Pharm is [facility] [place] told her I would ask if that could be sent. SM

## 2022-02-23 NOTE — TELEPHONE ENCOUNTER
----- Message from Rosaura Redman MD sent at 2/23/2022  9:57 AM EST -----  Penta patient.  Please let her know that her vaginal culture was negative for infection.

## 2023-03-06 ENCOUNTER — OFFICE VISIT (OUTPATIENT)
Dept: OBSTETRICS AND GYNECOLOGY | Facility: CLINIC | Age: 63
End: 2023-03-06
Payer: MEDICAID

## 2023-03-06 VITALS
SYSTOLIC BLOOD PRESSURE: 140 MMHG | BODY MASS INDEX: 31.82 KG/M2 | WEIGHT: 198 LBS | HEIGHT: 66 IN | DIASTOLIC BLOOD PRESSURE: 99 MMHG

## 2023-03-06 DIAGNOSIS — N89.8 VAGINAL DISCHARGE: ICD-10-CM

## 2023-03-06 DIAGNOSIS — N95.1 MENOPAUSAL STATE: ICD-10-CM

## 2023-03-06 DIAGNOSIS — Z01.411 ENCOUNTER FOR GYNECOLOGICAL EXAMINATION WITH ABNORMAL FINDING: Primary | ICD-10-CM

## 2023-03-06 PROCEDURE — 99396 PREV VISIT EST AGE 40-64: CPT | Performed by: OBSTETRICS & GYNECOLOGY

## 2023-03-06 NOTE — PROGRESS NOTES
Subjective    Chief Complaint   Patient presents with   • Gynecologic Exam     AE      History of Present Illness    Zack Coppola is a 62 y.o. female who presents for annual exam.  Non-smoker.  Mammogram due in July.  Patient does not want a DEXA scan.  Colonoscopy due in 2025 due to polyp history.  Having some mild vaginal discharge and would like a vaginal culture.  No bleeding or other problems    Obstetric History:  OB History    No obstetric history on file.        Menstrual History:     No LMP recorded. Patient is postmenopausal.       Past Medical History:   Diagnosis Date   • Diabetes (HCC)    • Hypertension      Family History   Problem Relation Age of Onset   • Hypertension Mother    • Hypertension Brother    • Hypertension Sister    • Hypertension Brother      Social History     Tobacco Use   Smoking Status Former   Smokeless Tobacco Never         The following portions of the patient's history were reviewed and updated as appropriate: allergies, current medications, past family history, past medical history, past social history, past surgical history and problem list.    Review of Systems   Constitutional: Negative.  Negative for fever and unexpected weight change.   HENT: Negative.    Respiratory: Negative for shortness of breath and wheezing.    Cardiovascular: Negative for chest pain, palpitations and leg swelling.   Gastrointestinal: Negative for abdominal pain, anal bleeding and blood in stool.   Genitourinary: Positive for vaginal discharge. Negative for dysuria, pelvic pain, urgency, vaginal bleeding and vaginal pain.   Skin: Negative.    Neurological: Negative.    Hematological: Negative.  Negative for adenopathy.   Psychiatric/Behavioral: Negative.  Negative for dysphoric mood. The patient is not nervous/anxious.             Objective   Physical Exam  Exam conducted with a chaperone present.   Constitutional:       Appearance: Normal appearance. She is well-developed.   HENT:      Head:  "Normocephalic.   Neck:      Thyroid: No thyromegaly.      Trachea: Trachea normal. No tracheal deviation.   Cardiovascular:      Rate and Rhythm: Normal rate and regular rhythm.      Heart sounds: Normal heart sounds. No murmur heard.  Pulmonary:      Effort: Pulmonary effort is normal.      Breath sounds: Normal breath sounds.   Chest:   Breasts:     Right: Normal. No mass, nipple discharge or tenderness.      Left: Normal. No mass, nipple discharge or tenderness.   Abdominal:      Palpations: Abdomen is soft. There is no mass.      Tenderness: There is no abdominal tenderness.      Hernia: No hernia is present.   Genitourinary:     General: Normal vulva.      Labia:         Right: No tenderness or lesion.         Left: No tenderness or lesion.       Urethra: No prolapse or urethral lesion.      Vagina: Vaginal discharge present. No lesions.      Cervix: No cervical motion tenderness.      Uterus: Not enlarged and not tender.       Adnexa:         Right: No mass or tenderness.          Left: No mass or tenderness.        Rectum: Normal. No external hemorrhoid or internal hemorrhoid. Normal anal tone.      Comments: External genitalia normal   Lymphadenopathy:      Cervical: No cervical adenopathy.      Upper Body:      Right upper body: No axillary adenopathy.      Left upper body: No axillary adenopathy.   Skin:     General: Skin is warm and dry.      Findings: No rash.   Neurological:      Mental Status: She is alert and oriented to person, place, and time.   Psychiatric:         Behavior: Behavior normal.         /99   Ht 166.4 cm (65.5\")   Wt 89.8 kg (198 lb)   BMI 32.45 kg/m²     Assessment & Plan   Diagnoses and all orders for this visit:    1. Encounter for gynecological examination with abnormal finding (Primary)  -     IGP,rfx Aptima HPV All Pth    2. Vaginal discharge  -     NuSwab VG+ - Swab, Vagina    3. Menopausal state        Mammogram.  Return to office 1 year.  Counseled about continuing " vitamin D3 for osteoporosis prevention.  Colorectal screening up-to-date.

## 2023-03-08 ENCOUNTER — TELEPHONE (OUTPATIENT)
Dept: OBSTETRICS AND GYNECOLOGY | Facility: CLINIC | Age: 63
End: 2023-03-08
Payer: MEDICAID

## 2023-03-08 LAB
A VAGINAE DNA VAG QL NAA+PROBE: NORMAL SCORE
BVAB2 DNA VAG QL NAA+PROBE: NORMAL SCORE
C ALBICANS DNA VAG QL NAA+PROBE: NEGATIVE
C GLABRATA DNA VAG QL NAA+PROBE: NEGATIVE
C TRACH DNA VAG QL NAA+PROBE: NEGATIVE
MEGA1 DNA VAG QL NAA+PROBE: NORMAL SCORE
N GONORRHOEA DNA VAG QL NAA+PROBE: NEGATIVE
T VAGINALIS DNA VAG QL NAA+PROBE: NEGATIVE

## 2023-03-08 NOTE — TELEPHONE ENCOUNTER
----- Message from Mitchel Aquino MD sent at 3/8/2023 12:48 PM EST -----  Please tell pt her vag culture was neg for infection. KINGSLEY

## 2023-03-13 LAB
CONV .: NORMAL
CYTOLOGIST CVX/VAG CYTO: NORMAL
CYTOLOGY CVX/VAG DOC CYTO: NORMAL
CYTOLOGY CVX/VAG DOC THIN PREP: NORMAL
DX ICD CODE: NORMAL
HIV 1 & 2 AB SER-IMP: NORMAL
Lab: NORMAL
OTHER STN SPEC: NORMAL
STAT OF ADQ CVX/VAG CYTO-IMP: NORMAL